# Patient Record
Sex: FEMALE | Race: WHITE | NOT HISPANIC OR LATINO | Employment: OTHER | URBAN - NONMETROPOLITAN AREA
[De-identification: names, ages, dates, MRNs, and addresses within clinical notes are randomized per-mention and may not be internally consistent; named-entity substitution may affect disease eponyms.]

---

## 2021-07-14 ENCOUNTER — OFFICE VISIT (OUTPATIENT)
Dept: FAMILY MEDICINE | Facility: OTHER | Age: 61
End: 2021-07-14
Attending: NURSE PRACTITIONER
Payer: COMMERCIAL

## 2021-07-14 VITALS
SYSTOLIC BLOOD PRESSURE: 110 MMHG | BODY MASS INDEX: 21.11 KG/M2 | DIASTOLIC BLOOD PRESSURE: 60 MMHG | TEMPERATURE: 98 F | HEART RATE: 59 BPM | WEIGHT: 139.3 LBS | OXYGEN SATURATION: 95 % | RESPIRATION RATE: 18 BRPM | HEIGHT: 68 IN

## 2021-07-14 DIAGNOSIS — N30.00 ACUTE CYSTITIS WITHOUT HEMATURIA: ICD-10-CM

## 2021-07-14 DIAGNOSIS — R39.89 URINARY PROBLEM: Primary | ICD-10-CM

## 2021-07-14 LAB
ALBUMIN UR-MCNC: NEGATIVE MG/DL
APPEARANCE UR: CLEAR
BACTERIA #/AREA URNS HPF: ABNORMAL /HPF
BILIRUB UR QL STRIP: NEGATIVE
COLOR UR AUTO: YELLOW
GLUCOSE UR STRIP-MCNC: NEGATIVE MG/DL
HGB UR QL STRIP: NEGATIVE
HYALINE CASTS: 2 /LPF
KETONES UR STRIP-MCNC: NEGATIVE MG/DL
LEUKOCYTE ESTERASE UR QL STRIP: ABNORMAL
NITRATE UR QL: NEGATIVE
PH UR STRIP: 7 [PH] (ref 5–9)
RBC URINE: 2 /HPF
SP GR UR STRIP: 1.01 (ref 1–1.03)
UROBILINOGEN UR STRIP-MCNC: NORMAL MG/DL
WBC CLUMPS #/AREA URNS HPF: PRESENT /HPF
WBC URINE: 172 /HPF

## 2021-07-14 PROCEDURE — 87086 URINE CULTURE/COLONY COUNT: CPT | Mod: ZL | Performed by: NURSE PRACTITIONER

## 2021-07-14 PROCEDURE — 99203 OFFICE O/P NEW LOW 30 MIN: CPT | Performed by: NURSE PRACTITIONER

## 2021-07-14 PROCEDURE — 81001 URINALYSIS AUTO W/SCOPE: CPT | Mod: ZL | Performed by: NURSE PRACTITIONER

## 2021-07-14 RX ORDER — SULFAMETHOXAZOLE AND TRIMETHOPRIM 400; 80 MG/1; MG/1
1 TABLET ORAL 2 TIMES DAILY
COMMUNITY
End: 2021-09-21

## 2021-07-14 RX ORDER — NITROFURANTOIN 25; 75 MG/1; MG/1
100 CAPSULE ORAL 2 TIMES DAILY
Qty: 10 CAPSULE | Refills: 0 | Status: SHIPPED | OUTPATIENT
Start: 2021-07-14 | End: 2021-07-19

## 2021-07-14 ASSESSMENT — PAIN SCALES - GENERAL: PAINLEVEL: MILD PAIN (2)

## 2021-07-14 ASSESSMENT — MIFFLIN-ST. JEOR: SCORE: 1255.36

## 2021-07-14 NOTE — NURSING NOTE
"Chief Complaint   Patient presents with     Urinary Problem     Patient presented to the clinic with a possible UTI that started a week ago. She had her fathers doctor prescribe the medication for her with out being seen at any facility. She is currently taking bacterium DS one tab twice daily that she started taking on July 2nd. She is still having pain and burning with urination.    Initial /60 (BP Location: Right arm, Patient Position: Sitting, Cuff Size: Adult Regular)   Pulse 59   Temp 98  F (36.7  C) (Tympanic)   Resp 18   Ht 1.727 m (5' 8\")   Wt 63.2 kg (139 lb 4.8 oz)   SpO2 95%   Breastfeeding No   BMI 21.18 kg/m   Estimated body mass index is 21.18 kg/m  as calculated from the following:    Height as of this encounter: 1.727 m (5' 8\").    Weight as of this encounter: 63.2 kg (139 lb 4.8 oz).     FOOD SECURITY SCREENING QUESTIONS  Hunger Vital Signs:  Within the past 12 months we worried whether our food would run out before we got money to buy more. Never  Within the past 12 months the food we bought just didn't last and we didn't have money to get more. Never      Medication Reconciliation: Complete      Jacque Hwang LPN   "

## 2021-07-14 NOTE — PROGRESS NOTES
ASSESSMENT/PLAN:    I have reviewed the nursing notes.  I have reviewed the findings, diagnosis, plan and need for follow up with the patient.    1. Urinary problem  2. Acute cystitis without hematuria  - nitroFURantoin macrocrystal-monohydrate (MACROBID) 100 MG capsule; Take 1 capsule (100 mg) by mouth 2 times daily for 5 days  Dispense: 10 capsule; Refill: 0  - UA reflex to Microscopic and Culture  - Urine Culture  Vital signs stable today. Today's WBC clumps and 172+ WBCs. Will call patient with culture results if indicate a change in antibiotics. Recommend full course of macrobid unless told otherwise following urine culture.  STOP taking bactrim.   Encouraged oral hydration/increased water intake.    May use over-the-counter Tylenol    Discussed warning signs/symptoms indicative of need to f/u    Follow up if symptoms persist or worsen or concerns    I explained my diagnostic considerations and recommendations to the patient, who voiced understanding and agreement with the treatment plan. All questions were answered. We discussed potential side effects of any prescribed or recommended therapies, as well as expectations for response to treatments.    Maeve Loja NP  7/14/2021  11:26 AM    HPI:  Yolette Don is a 60 year old female who presents to [facility] today for Patient presented to the clinic with a possible UTI that started a week ago. A doctor of her family member's prescribed bactrim DS 1 tab twice daily x14 days which has helped some but has not fully resolved her symptoms.  Was not seen at any facility and a urinalysis not obtained prior to start of antibiotics. Has 2 days left of bactrim. She is still having pain and burning with urination. No fevers or chills now. No concerns of any STIs or yeast. No vaginal discharge. No obvious hematuria. No back pain. Has urgency, less burning than she had initially. Drinking a lot of water, some frequency remains. Has been several years since last  "UTI, can't remember the last one.     Negative ROS aside from above HPI.           No past medical history on file.  No past surgical history on file.  Social History     Tobacco Use     Smoking status: Never Smoker     Smokeless tobacco: Never Used   Substance Use Topics     Alcohol use: Yes     Current Outpatient Medications   Medication Sig Dispense Refill     nitroFURantoin macrocrystal-monohydrate (MACROBID) 100 MG capsule Take 1 capsule (100 mg) by mouth 2 times daily for 5 days 10 capsule 0     sulfamethoxazole-trimethoprim (BACTRIM) 400-80 MG tablet Take 1 tablet by mouth 2 times daily       No Known Allergies  Past medical history, past surgical history, current medications and allergies reviewed and accurate to the best of my knowledge.      ROS:  Refer to HPI    /60 (BP Location: Right arm, Patient Position: Sitting, Cuff Size: Adult Regular)   Pulse 59   Temp 98  F (36.7  C) (Tympanic)   Resp 18   Ht 1.727 m (5' 8\")   Wt 63.2 kg (139 lb 4.8 oz)   SpO2 95%   Breastfeeding No   BMI 21.18 kg/m      EXAM:  General Appearance: Well appearing, 60 year old female, appropriate appearance for age. No acute distress  Respiratory: normal chest wall and respirations.  Normal effort.  Clear to auscultation bilaterally, no wheezing, crackles or rhonchi.  No increased work of breathing.  No cough appreciated.  Cardiac: RRR with no murmurs  Abdomen: soft, nontender, no rigidity, no rebound tenderness or guarding, normal bowel sounds present  : mild suprapubic tenderness to palpation.  No CVA tenderness to palpation.    Musculoskeletal:  Equal movement of bilateral upper extremities.  Equal movement of bilateral lower extremities.  Normal gait.    Dermatological: no rashes noted of exposed skin  Psychological: normal affect, alert, oriented, and pleasant.     "

## 2021-07-16 LAB — BACTERIA UR CULT: ABNORMAL

## 2021-09-21 ENCOUNTER — OFFICE VISIT (OUTPATIENT)
Dept: FAMILY MEDICINE | Facility: OTHER | Age: 61
End: 2021-09-21
Attending: FAMILY MEDICINE
Payer: COMMERCIAL

## 2021-09-21 VITALS
DIASTOLIC BLOOD PRESSURE: 86 MMHG | RESPIRATION RATE: 16 BRPM | WEIGHT: 138.8 LBS | HEIGHT: 68 IN | BODY MASS INDEX: 21.04 KG/M2 | HEART RATE: 64 BPM | OXYGEN SATURATION: 98 % | TEMPERATURE: 98.2 F | SYSTOLIC BLOOD PRESSURE: 126 MMHG

## 2021-09-21 DIAGNOSIS — Z02.89 HEALTH EXAMINATION OF DEFINED SUBPOPULATION: Primary | ICD-10-CM

## 2021-09-21 DIAGNOSIS — M47.812 SPONDYLOSIS OF CERVICAL REGION WITHOUT MYELOPATHY OR RADICULOPATHY: ICD-10-CM

## 2021-09-21 DIAGNOSIS — E04.1 THYROID NODULE: ICD-10-CM

## 2021-09-21 LAB
ANION GAP SERPL CALCULATED.3IONS-SCNC: 8 MMOL/L (ref 3–14)
BUN SERPL-MCNC: 15 MG/DL (ref 7–25)
CALCIUM SERPL-MCNC: 9.4 MG/DL (ref 8.6–10.3)
CHLORIDE BLD-SCNC: 102 MMOL/L (ref 98–107)
CO2 SERPL-SCNC: 29 MMOL/L (ref 21–31)
CREAT SERPL-MCNC: 0.79 MG/DL (ref 0.6–1.2)
GFR SERPL CREATININE-BSD FRML MDRD: 82 ML/MIN/1.73M2
GLUCOSE BLD-MCNC: 90 MG/DL (ref 70–105)
HOLD SPECIMEN: NORMAL
HOLD SPECIMEN: NORMAL
POTASSIUM BLD-SCNC: 3.9 MMOL/L (ref 3.5–5.1)
SODIUM SERPL-SCNC: 139 MMOL/L (ref 134–144)

## 2021-09-21 PROCEDURE — 36415 COLL VENOUS BLD VENIPUNCTURE: CPT | Mod: ZL | Performed by: FAMILY MEDICINE

## 2021-09-21 PROCEDURE — 80048 BASIC METABOLIC PNL TOTAL CA: CPT | Mod: ZL | Performed by: FAMILY MEDICINE

## 2021-09-21 PROCEDURE — 99396 PREV VISIT EST AGE 40-64: CPT | Performed by: FAMILY MEDICINE

## 2021-09-21 ASSESSMENT — PAIN SCALES - GENERAL: PAINLEVEL: NO PAIN (0)

## 2021-09-21 ASSESSMENT — MIFFLIN-ST. JEOR: SCORE: 1244.12

## 2021-09-21 NOTE — PROGRESS NOTES
"  SUBJECTIVE:   Yolette Don is a 61 year old female who presents to clinic today for the following health issues: Physical    Patient arrives here for physical.  She recently came back from [place].  She states that she has had a Pap smear mammogram within the last year.  Colonoscopy within the last 10 years.  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  Is moving back to [place] to establish a more permanent residency.  Currently does not offer any complaints or concerns        Patient Active Problem List    Diagnosis Date Noted     Spondylosis of cervical region without myelopathy or radiculopathy 09/21/2021     Priority: Medium     Thyroid nodule 09/21/2021     Priority: Medium     History reviewed. No pertinent past medical history.   Past Surgical History:   Procedure Laterality Date     COLONOSCOPY         Review of Systems     OBJECTIVE:     /86   Pulse 64   Temp 98.2  F (36.8  C) (Temporal)   Resp 16   Ht 1.721 m (5' 7.75\")   Wt 63 kg (138 lb 12.8 oz)   SpO2 98%   BMI 21.26 kg/m    Body mass index is 21.26 kg/m .  Physical Exam  Constitutional:       Appearance: Normal appearance.   Cardiovascular:      Rate and Rhythm: Normal rate and regular rhythm.   Pulmonary:      Effort: Pulmonary effort is normal.      Breath sounds: Normal breath sounds.   Musculoskeletal:      Cervical back: Normal range of motion.   Lymphadenopathy:      Cervical: No cervical adenopathy.   Neurological:      Mental Status: She is alert.   Psychiatric:         Mood and Affect: Mood normal.         Thought Content: Thought content normal.         Diagnostic Test Results:  Results for orders placed or performed in visit on 09/21/21 (from the past 24 hour(s))   Basic Metabolic Panel   Result Value Ref Range    Sodium 139 134 - 144 mmol/L    Potassium 3.9 3.5 - 5.1 mmol/L    Chloride 102 98 - 107 mmol/L    Carbon Dioxide (CO2) 29 21 - 31 mmol/L    " Anion Gap 8 3 - 14 mmol/L    Urea Nitrogen 15 7 - 25 mg/dL    Creatinine 0.79 0.60 - 1.20 mg/dL    Calcium 9.4 8.6 - 10.3 mg/dL    Glucose 90 70 - 105 mg/dL    GFR Estimate 82 >60 mL/min/1.73m2   Extra Tube    Narrative    The following orders were created for panel order Extra Tube.  Procedure                               Abnormality         Status                     ---------                               -----------         ------                     Extra Serum Separator Tu...[[id number]]                      Final result               Extra Purple Top Tube[[id number]]                            Final result                 Please view results for these tests on the individual orders.   Extra Serum Separator Tube (SST)   Result Value Ref Range    Hold Specimen JIC    Extra Purple Top Tube   Result Value Ref Range    Hold Specimen JIC        ASSESSMENT/PLAN:         (Z02.89) Health examination of defined subpopulation  (primary encounter diagnosis)  Comment: Record is updated.  She will bring her immunizations and we can update her [facility]  Plan: Basic Metabolic Panel            (M47.812) Spondylosis of cervical region without myelopathy or radiculopathy  Comment:   Plan:     (E04.1) Thyroid nodule  Comment:   Plan:       Erick Chowdhury MD  [facility]

## 2021-09-21 NOTE — NURSING NOTE
"Chief Complaint   Patient presents with     Physical     annual         Initial /86   Pulse 64   Temp 98.2  F (36.8  C) (Temporal)   Resp 16   Ht 1.721 m (5' 7.75\")   Wt 63 kg (138 lb 12.8 oz)   SpO2 98%   BMI 21.26 kg/m   Estimated body mass index is 21.26 kg/m  as calculated from the following:    Height as of this encounter: 1.721 m (5' 7.75\").    Weight as of this encounter: 63 kg (138 lb 12.8 oz).     FOOD SECURITY SCREENING QUESTIONS  Hunger Vital Signs:  Within the past 12 months we worried whether our food would run out before we got money to buy more. Never  Within the past 12 months the food we bought just didn't last and we didn't have money to get more. Never      Medication Reconciliation: Complete      Norma J. Gosselin, LPN   "

## 2021-10-06 ENCOUNTER — ALLIED HEALTH/NURSE VISIT (OUTPATIENT)
Dept: FAMILY MEDICINE | Facility: OTHER | Age: 61
End: 2021-10-06
Attending: FAMILY MEDICINE
Payer: COMMERCIAL

## 2021-10-06 DIAGNOSIS — Z20.822 EXPOSURE TO 2019 NOVEL CORONAVIRUS: Primary | ICD-10-CM

## 2021-10-06 PROCEDURE — C9803 HOPD COVID-19 SPEC COLLECT: HCPCS

## 2021-10-06 PROCEDURE — U0003 INFECTIOUS AGENT DETECTION BY NUCLEIC ACID (DNA OR RNA); SEVERE ACUTE RESPIRATORY SYNDROME CORONAVIRUS 2 (SARS-COV-2) (CORONAVIRUS DISEASE [COVID-19]), AMPLIFIED PROBE TECHNIQUE, MAKING USE OF HIGH THROUGHPUT TECHNOLOGIES AS DESCRIBED BY CMS-2020-01-R: HCPCS | Mod: ZL

## 2021-10-07 LAB — SARS-COV-2 RNA RESP QL NAA+PROBE: NEGATIVE

## 2021-10-17 ENCOUNTER — HEALTH MAINTENANCE LETTER (OUTPATIENT)
Age: 61
End: 2021-10-17

## 2021-12-08 ENCOUNTER — HOSPITAL ENCOUNTER (OUTPATIENT)
Dept: MAMMOGRAPHY | Facility: OTHER | Age: 61
Discharge: HOME | End: 2021-12-08
Attending: FAMILY MEDICINE | Admitting: FAMILY MEDICINE
Payer: COMMERCIAL

## 2021-12-08 DIAGNOSIS — Z12.31 VISIT FOR SCREENING MAMMOGRAM: ICD-10-CM

## 2021-12-08 PROCEDURE — 77067 SCR MAMMO BI INCL CAD: CPT

## 2022-10-03 ENCOUNTER — HEALTH MAINTENANCE LETTER (OUTPATIENT)
Age: 62
End: 2022-10-03

## 2023-02-11 ENCOUNTER — HEALTH MAINTENANCE LETTER (OUTPATIENT)
Age: 63
End: 2023-02-11

## 2023-08-05 ENCOUNTER — VIRTUAL VISIT (OUTPATIENT)
Dept: FAMILY MEDICINE | Facility: OTHER | Age: 63
End: 2023-08-05
Attending: NURSE PRACTITIONER
Payer: COMMERCIAL

## 2023-08-05 DIAGNOSIS — U07.1 INFECTION DUE TO 2019 NOVEL CORONAVIRUS: Primary | ICD-10-CM

## 2023-08-05 PROCEDURE — 99441 PR PHYSICIAN TELEPHONE EVALUATION 5-10 MIN: CPT | Mod: 95 | Performed by: NURSE PRACTITIONER

## 2023-08-05 NOTE — PROGRESS NOTES
"Yolette is a 63 year old who is being evaluated via a billable telephone visit.      What phone number would you like to be contacted at? [phone]  How would you like to obtain your AVS? Luz Maria    Distant Location (provider location):  Off-site    Assessment & Plan   Problem List Items Addressed This Visit    None  Visit Diagnoses       Infection due to 2019 novel coronavirus    -  Primary    Relevant Medications    nirmatrelvir and ritonavir (PAXLOVID) 300 mg/100 mg therapy pack           Home COVID test positive, she is interested in antiviral treatment.  She is not currently taking any medications.  Discussed side effects medication, risk and benefits.  Follow-up as needed.  Prescription drug management  6 minutes spent by me on the date of the encounter doing chart review, history and exam, documentation and further activities per the note           No follow-ups on file.    BARBARA Valdez [facility]    Subjective   Yolette is a 63 year old, presenting for the following health issues:  Covid Concern (Pos test 8/4/2023)      HPI       COVID-19 Symptom Review  How many days ago did these symptoms start? 3 days ago started with symptoms    Are any of the following symptoms significant for you?  New or worsening difficulty breathing? No  Worsening cough? Yes, I am coughing up mucus.  Fever or chills? Yes  Headache: YES  Sore throat: YES  Chest pain: No  Diarrhea: No  Body aches? YES    What treatments has patient tried? Cough syrup   Does patient live in a nursing home, group home, or shelter? No  Does patient have a way to get food/medications during quarantined? Yes, I have a friend or family member who can help me.     4 flights in past week, no known ill contacts      Review of Systems   See above      Objective    Vitals - Patient Reported  Weight (Patient Reported): 61.2 kg (135 lb)  Height (Patient Reported): 172.7 cm (5' 8\")  BMI (Based on Pt Reported Ht/Wt): " 20.53  Pain Score: No Pain (0)        Physical Exam   healthy, alert, and no distress  PSYCH: Alert and oriented times 3; coherent speech, normal   rate and volume, able to articulate logical thoughts, able   to abstract reason, no tangential thoughts, no hallucinations   or delusions  Her affect is normal  RESP: occasional cough, no audible wheezing, able to talk in full sentences  Remainder of exam unable to be completed due to telephone visits            Phone call duration: 5 minutes

## 2023-10-19 ENCOUNTER — HOSPITAL ENCOUNTER (OUTPATIENT)
Dept: MAMMOGRAPHY | Facility: OTHER | Age: 63
Discharge: HOME | End: 2023-10-19
Admitting: FAMILY MEDICINE
Payer: COMMERCIAL

## 2023-10-19 DIAGNOSIS — Z12.31 VISIT FOR SCREENING MAMMOGRAM: ICD-10-CM

## 2023-10-19 PROCEDURE — 77067 SCR MAMMO BI INCL CAD: CPT

## 2023-11-21 ENCOUNTER — OFFICE VISIT (OUTPATIENT)
Dept: FAMILY MEDICINE | Facility: OTHER | Age: 63
End: 2023-11-21
Attending: NURSE PRACTITIONER
Payer: COMMERCIAL

## 2023-11-21 VITALS
HEIGHT: 67 IN | TEMPERATURE: 97.8 F | DIASTOLIC BLOOD PRESSURE: 78 MMHG | SYSTOLIC BLOOD PRESSURE: 120 MMHG | BODY MASS INDEX: 21.97 KG/M2 | RESPIRATION RATE: 16 BRPM | HEART RATE: 63 BPM | WEIGHT: 140 LBS | OXYGEN SATURATION: 97 %

## 2023-11-21 DIAGNOSIS — Z12.4 CERVICAL CANCER SCREENING: ICD-10-CM

## 2023-11-21 DIAGNOSIS — Z00.00 ROUTINE GENERAL MEDICAL EXAMINATION AT A HEALTH CARE FACILITY: Primary | ICD-10-CM

## 2023-11-21 DIAGNOSIS — K59.01 SLOW TRANSIT CONSTIPATION: ICD-10-CM

## 2023-11-21 DIAGNOSIS — Z13.220 LIPID SCREENING: ICD-10-CM

## 2023-11-21 DIAGNOSIS — Z78.0 POSTMENOPAUSAL STATUS: ICD-10-CM

## 2023-11-21 DIAGNOSIS — R10.13 EPIGASTRIC PAIN: ICD-10-CM

## 2023-11-21 LAB
ALBUMIN SERPL BCG-MCNC: 4.1 G/DL (ref 3.5–5.2)
ALP SERPL-CCNC: 59 U/L (ref 40–150)
ALT SERPL W P-5'-P-CCNC: 21 U/L (ref 0–50)
ANION GAP SERPL CALCULATED.3IONS-SCNC: 10 MMOL/L (ref 7–15)
AST SERPL W P-5'-P-CCNC: 25 U/L (ref 0–45)
BILIRUB SERPL-MCNC: 0.3 MG/DL
BUN SERPL-MCNC: 17.9 MG/DL (ref 8–23)
CALCIUM SERPL-MCNC: 9 MG/DL (ref 8.8–10.2)
CHLORIDE SERPL-SCNC: 104 MMOL/L (ref 98–107)
CHOLEST SERPL-MCNC: 238 MG/DL
CREAT SERPL-MCNC: 0.79 MG/DL (ref 0.51–0.95)
DEPRECATED HCO3 PLAS-SCNC: 27 MMOL/L (ref 22–29)
EGFRCR SERPLBLD CKD-EPI 2021: 84 ML/MIN/1.73M2
GLUCOSE SERPL-MCNC: 87 MG/DL (ref 70–99)
HDLC SERPL-MCNC: 81 MG/DL
HOLD SPECIMEN: NORMAL
LDLC SERPL CALC-MCNC: 134 MG/DL
NONHDLC SERPL-MCNC: 157 MG/DL
POTASSIUM SERPL-SCNC: 3.7 MMOL/L (ref 3.4–5.3)
PROT SERPL-MCNC: 6.5 G/DL (ref 6.4–8.3)
SODIUM SERPL-SCNC: 141 MMOL/L (ref 135–145)
TRIGL SERPL-MCNC: 116 MG/DL

## 2023-11-21 PROCEDURE — 80053 COMPREHEN METABOLIC PANEL: CPT | Mod: ZL | Performed by: NURSE PRACTITIONER

## 2023-11-21 PROCEDURE — 80061 LIPID PANEL: CPT | Mod: ZL | Performed by: NURSE PRACTITIONER

## 2023-11-21 PROCEDURE — 99212 OFFICE O/P EST SF 10 MIN: CPT | Mod: 25 | Performed by: NURSE PRACTITIONER

## 2023-11-21 PROCEDURE — G0123 SCREEN CERV/VAG THIN LAYER: HCPCS | Performed by: NURSE PRACTITIONER

## 2023-11-21 PROCEDURE — 36415 COLL VENOUS BLD VENIPUNCTURE: CPT | Mod: ZL | Performed by: NURSE PRACTITIONER

## 2023-11-21 PROCEDURE — 99396 PREV VISIT EST AGE 40-64: CPT | Performed by: NURSE PRACTITIONER

## 2023-11-21 PROCEDURE — 87624 HPV HI-RISK TYP POOLED RSLT: CPT | Mod: ZL | Performed by: NURSE PRACTITIONER

## 2023-11-21 ASSESSMENT — ENCOUNTER SYMPTOMS
BREAST MASS: 0
ABDOMINAL PAIN: 1
CONSTIPATION: 1

## 2023-11-21 ASSESSMENT — PAIN SCALES - GENERAL: PAINLEVEL: NO PAIN (0)

## 2023-11-21 NOTE — COMMUNITY RESOURCES LIST (ENGLISH)
11/21/2023    [employer] [place]  N/A  For questions about this resource list or additional care needs, please contact your primary care clinic or care manager.  Phone: [phone]   Email: N/A   Address: [address] [place] [id number]   Hours: N/A        Financial Stability       Utility payment assistance  1  [employer] [facility] Main [facility] - Energy Assistance Program Distance: 10.46 miles      Phone/Virtual   [address] [facility] [address] [place] [address]  Language: English  Hours: Mon - Thu 8:00 AM - 4:30 PM , Fri 8:00 AM - 12:00 PM  Fees: Free   Phone: (441) 210-3334 Email: reyes@[employer] Website: http://www.[employer]          Important Numbers & Websites       Emergency Services   911  City Services   311  Poison Control   (834) 401-7758  Suicide Prevention Lifeline   (373) 181-3231 (TALK)  Child Abuse Hotline   (532) 818-1424 (4-A-Child)  Sexual Assault Hotline   (669) 196-4127 (HOPE)  National Runaway Safeline   (788) 783-4594 (RUNAWAY)  All-Options Talkline   (785) 871-7861  Substance Abuse Referral   (875) 810-3565 (HELP)

## 2023-11-21 NOTE — PROGRESS NOTES
SUBJECTIVE:   Yolette is a 63 year old, presenting for the following:  Physical        Healthy Habits:     Getting at least 3 servings of Calcium per day:  Yes    Bi-annual eye exam:  Yes    Dental care twice a year:  Yes    Sleep apnea or symptoms of sleep apnea:  None    Diet:  Regular (no restrictions)    Frequency of exercise:  4-5 days/week    Duration of exercise:  30-45 minutes    Taking medications regularly:  Yes    Medication side effects:  None    Additional concerns today:  Yes      Today's PHQ-2 Score:       11/21/2023     1:01 PM   PHQ-2 ( 1999 [employer])   Q1: Little interest or pleasure in doing things 1   Q2: Feeling down, depressed or hopeless 1   PHQ-2 Score 2   Q1: Little interest or pleasure in doing things Several days   Q2: Feeling down, depressed or hopeless Several days   PHQ-2 Score 2       She comes in today for annual physical.  She recently moved back to the area.  She has been traveling for the past several years, most recently was living in [facility].  She does report she had a colonoscopy completed in 2022 while she was in [facility].  They stated she was not fully cleaned out but did not have any concerns.  She has been having constipation, reports this is not a new concern but seems to be worsening.  She eats very healthy, increase fiber, no processed foods, fruits and vegetables.  She does regular exercise, Pilates and yoga.  She does report her stools are very dry and hard to pass.  Sometimes she has to press on her vaginal wall in order to pass her stools.  She has stools approximately 1 time a week.  She will use occasional over-the-counter senna.  She has had some epigastric tenderness, area is firm and tender with palpation.  Over-the-counter supplements include magnesium, vitamin C, DAVID and cognitive or oil.    She reports her last Pap smear was in 2022, possible HPV.  She is interested and getting DEXA scan and lab work updated.  Mammogram was updated recently.  She has had blood  donations in the past, hepatitis C was screened at that time.  She has had 2 shingles vaccines.    Social History     Tobacco Use    Smoking status: Never    Smokeless tobacco: Never   Substance Use Topics    Alcohol use: Yes     Alcohol/week: 5.0 standard drinks of alcohol     Types: 5 Glasses of wine per week             11/21/2023     1:01 PM   Alcohol Use   Prescreen: >3 drinks/day or >7 drinks/week? No     Reviewed orders with patient.  Reviewed health maintenance and updated orders accordingly - Yes  BP Readings from Last 3 Encounters:   11/21/23 120/78   09/21/21 126/86   07/14/21 110/60    Wt Readings from Last 3 Encounters:   11/21/23 63.5 kg (140 lb)   09/21/21 63 kg (138 lb 12.8 oz)   07/14/21 63.2 kg (139 lb 4.8 oz)                  Patient Active Problem List   Diagnosis    Spondylosis of cervical region without myelopathy or radiculopathy    Thyroid nodule     Past Surgical History:   Procedure Laterality Date    COLONOSCOPY         Social History     Tobacco Use    Smoking status: Never    Smokeless tobacco: Never   Substance Use Topics    Alcohol use: Yes     Alcohol/week: 5.0 standard drinks of alcohol     Types: 5 Glasses of wine per week     History reviewed. No pertinent family history.      No current outpatient medications on file.     No Known Allergies    Breast Cancer Screening:  Any new diagnosis of family breast, ovarian, or bowel cancer? No    FHS-7:       12/8/2021     2:36 PM   Breast CA Risk Assessment (FHS-7)   Did any of your first-degree relatives have breast or ovarian cancer? No   Did any of your relatives have bilateral breast cancer? No   Did any man in your family have breast cancer? No   Did any woman in your family have breast and ovarian cancer? No   Did any woman in your family have breast cancer before age 50 y? No   Do you have 2 or more relatives with breast and/or ovarian cancer? No   Do you have 2 or more relatives with breast and/or bowel cancer? No       Mammogram  "Screening: Recommended mammography every 1-2 years with patient discussion and risk factor consideration  Pertinent mammograms are reviewed under the imaging tab.    History of abnormal Pap smear: YES - other categories - see link Cervical Cytology Screening Guidelines     Reviewed and updated as needed this visit by clinical staff   Tobacco  Allergies  Meds      Soc Hx        Reviewed and updated as needed this visit by Provider                 History reviewed. No pertinent past medical history.   Past Surgical History:   Procedure Laterality Date    COLONOSCOPY         Review of Systems   Gastrointestinal:  Positive for abdominal pain and constipation.   Breasts:  Negative for tenderness, breast mass and discharge.   Genitourinary:  Negative for pelvic pain, vaginal bleeding and vaginal discharge.          OBJECTIVE:   /78   Pulse 63   Temp 97.8  F (36.6  C)   Resp 16   Ht 1.708 m (5' 7.25\")   Wt 63.5 kg (140 lb)   SpO2 97%   BMI 21.76 kg/m    Physical Exam  GENERAL: healthy, alert and no distress  EYES: Eyes grossly normal to inspection, PERRL and conjunctivae and sclerae normal  HENT: ear canals and TM's normal, nose and mouth without ulcers or lesions  NECK: no adenopathy, no asymmetry, masses, or scars and thyroid normal to palpation  RESP: lungs clear to auscultation - no rales, rhonchi or wheezes  BREAST: deferred, recent mammogram  CV: regular rate and rhythm, normal S1 S2, no S3 or S4, no murmur, click or rub, no peripheral edema and peripheral pulses strong  ABDOMEN: soft, epigastric tenderness, firmness appreciated to epigastric region, no hepatosplenomegaly, no masses and bowel sounds normal   (female): normal female external genitalia, normal urethral meatus, vaginal mucosa pink, moist, well rugated, and normal cervix/adnexa/uterus without masses or discharge. Pap with HPV obtained  MS: no gross musculoskeletal defects noted, no edema  SKIN: no suspicious lesions or rashes  NEURO: " Normal strength and tone, mentation intact and speech normal  PSYCH: mentation appears normal, affect normal/bright    Diagnostic Test Results:  Labs reviewed in Epic  Results for orders placed or performed in visit on 11/21/23   Lipid Panel     Status: Abnormal   Result Value Ref Range    Cholesterol 238 (H) <200 mg/dL    Triglycerides 116 <150 mg/dL    Direct Measure HDL 81 >=50 mg/dL    LDL Cholesterol Calculated 134 (H) <=100 mg/dL    Non HDL Cholesterol 157 (H) <130 mg/dL    Narrative    Cholesterol  Desirable:  <200 mg/dL    Triglycerides  Normal:  Less than 150 mg/dL  Borderline High:  150-199 mg/dL  High:  200-499 mg/dL  Very High:  Greater than or equal to 500 mg/dL    Direct Measure HDL  Female:  Greater than or equal to 50 mg/dL   Male:  Greater than or equal to 40 mg/dL    LDL Cholesterol  Desirable:  <100mg/dL  Above Desirable:  100-129 mg/dL   Borderline High:  130-159 mg/dL   High:  160-189 mg/dL   Very High:  >= 190 mg/dL    Non HDL Cholesterol  Desirable:  130 mg/dL  Above Desirable:  130-159 mg/dL  Borderline High:  160-189 mg/dL  High:  190-219 mg/dL  Very High:  Greater than or equal to 220 mg/dL   Comprehensive Metabolic Panel     Status: Normal   Result Value Ref Range    Sodium 141 135 - 145 mmol/L    Potassium 3.7 3.4 - 5.3 mmol/L    Carbon Dioxide (CO2) 27 22 - 29 mmol/L    Anion Gap 10 7 - 15 mmol/L    Urea Nitrogen 17.9 8.0 - 23.0 mg/dL    Creatinine 0.79 0.51 - 0.95 mg/dL    GFR Estimate 84 >60 mL/min/1.73m2    Calcium 9.0 8.8 - 10.2 mg/dL    Chloride 104 98 - 107 mmol/L    Glucose 87 70 - 99 mg/dL    Alkaline Phosphatase 59 40 - 150 U/L    AST 25 0 - 45 U/L    ALT 21 0 - 50 U/L    Protein Total 6.5 6.4 - 8.3 g/dL    Albumin 4.1 3.5 - 5.2 g/dL    Bilirubin Total 0.3 <=1.2 mg/dL   Extra Tube     Status: None    Narrative    The following orders were created for panel order Extra Tube.  Procedure                               Abnormality         Status                     ---------                                -----------         ------                     Extra Purple Top Tube[[id number]]                            Final result                 Please view results for these tests on the individual orders.   Extra Purple Top Tube     Status: None   Result Value Ref Range    Hold Specimen JIC        ASSESSMENT/PLAN:       ICD-10-CM    1. Routine general medical examination at a health care facility  Z00.00 Comprehensive Metabolic Panel     Comprehensive Metabolic Panel      2. Lipid screening  Z13.220 Lipid Panel     Lipid Panel      3. Cervical cancer screening  Z12.4 Pap Screen with HPV - recommended age 30 - 65 years      4. Postmenopausal status  Z78.0 DX Hip/Pelvis/Spine      5. Epigastric pain  R10.13 US Abdomen Complete      6. Slow transit constipation  K59.01       CMP and lipid panel updated  Pap smear with HPV obtained and pending, will follow-up results when available  DEXA scan ordered  Epigastric pain with firm mass appreciated, ultrasound ordered for initial evaluation of AAA.  Pending ultrasound results, consider CT scan  Likely slow transit constipation, we discussed treatment and recommendations including MiraLAX.  She will consider this as a treatment option.  Current on colonoscopy and immunizations, records not available due to being [place] outside of [place]  Will follow-up with above results when available    Patient has been advised of split billing requirements and indicates understanding: Yes    The 10-year ASCVD risk score (Gualberto DUPREE, et al., 2019) is: 3.2%    Values used to calculate the score:      Age: 63 years      Sex: Female      Is Non- : No      Diabetic: No      Tobacco smoker: No      Systolic Blood Pressure: 120 mmHg      Is BP treated: No      HDL Cholesterol: 81 mg/dL      Total Cholesterol: 238 mg/dL    COUNSELING:  Reviewed preventive health counseling, as reflected in patient instructions       Regular exercise       Healthy  diet/nutrition       Vision screening       Osteoporosis prevention/bone health       Colorectal Cancer Screening        She reports that she has never smoked. She has never used smokeless tobacco.          BARBARA Valdez [facility]

## 2023-11-21 NOTE — COMMUNITY RESOURCES LIST (ENGLISH)
11/21/2023   [facility] - Outpatient Clinics  N/A  For additional resource needs, please contact your health insurance member services or your primary care team.  Phone: [phone]   Email: N/A   Address: [facility] [place] [address]   Hours: N/A        Financial Stability       Utility payment assistance  1  [place] [employer] - [place]'s Telephone Assistance Plan (TAP) and Federal Lifeline and Affordable Connectivity Program (ACP) Distance: 167.78 miles      Phone/Virtual   [address] [place] [address]  Language: English  Fees: Free   Phone: (169) 972-2502 Email: consumer.puc@[facility].mn. Website: https://[place].gov/puc/consumers/telephone/     2  [place] [employer] - [employer] and Utilities Distance: 168.29 miles      In-Person, Phone/Virtual   [address] [place] [address]  Language: English  Hours: Mon - Fri 8:30 AM - 4:30 PM  Fees: Free   Phone: (763) 549-7920 Website: [place]://[place]/[employer]/energy/consumer-assistance/energy-assistance-program/          Important Numbers & Websites       [facility]   211 211itedway.org  Poison Control   (356) 871-3386 [place]poison[place]  Suicide and Crisis Lifeline   988 [facility]  Childhelp [place] Child Abuse Hotline   748.765.7630 Childhelphotline.org  National Sexual Assault Hotline   (949) 972-1088 (HOPE) Rainn.org  National Runaway Safeline   (871) 398-9312 (RUNAWAY) 1800runaway.org  Pregnancy & Postpartum Support [place]   Call/text 593-002-6785 Ppsupport[place].org  Substance Abuse National Helpline ([facility]   822-342-HELP (6007) Findtreatment.gov  Emergency Services   911

## 2023-11-29 LAB
BKR LAB AP GYN ADEQUACY: NORMAL
BKR LAB AP GYN INTERPRETATION: NORMAL
BKR LAB AP HPV REFLEX: NORMAL
BKR LAB AP PREVIOUS ABNORMAL: NORMAL
PATH REPORT.COMMENTS IMP SPEC: NORMAL
PATH REPORT.COMMENTS IMP SPEC: NORMAL
PATH REPORT.RELEVANT HX SPEC: NORMAL

## 2023-12-01 LAB
HUMAN PAPILLOMA VIRUS 16 DNA: NEGATIVE
HUMAN PAPILLOMA VIRUS 18 DNA: NEGATIVE
HUMAN PAPILLOMA VIRUS FINAL DIAGNOSIS: NORMAL
HUMAN PAPILLOMA VIRUS OTHER HR: NEGATIVE

## 2023-12-07 ENCOUNTER — HOSPITAL ENCOUNTER (OUTPATIENT)
Dept: BONE DENSITY | Facility: OTHER | Age: 63
Discharge: HOME | End: 2023-12-07
Attending: NURSE PRACTITIONER | Admitting: NURSE PRACTITIONER
Payer: COMMERCIAL

## 2023-12-07 DIAGNOSIS — Z78.0 POSTMENOPAUSAL STATUS: ICD-10-CM

## 2023-12-07 PROCEDURE — 77080 DXA BONE DENSITY AXIAL: CPT

## 2023-12-20 ENCOUNTER — HOSPITAL ENCOUNTER (OUTPATIENT)
Dept: ULTRASOUND IMAGING | Facility: OTHER | Age: 63
Discharge: HOME | End: 2023-12-20
Attending: NURSE PRACTITIONER | Admitting: NURSE PRACTITIONER
Payer: COMMERCIAL

## 2023-12-20 DIAGNOSIS — R10.13 EPIGASTRIC PAIN: ICD-10-CM

## 2023-12-20 PROCEDURE — 76700 US EXAM ABDOM COMPLETE: CPT

## 2024-10-22 ENCOUNTER — TELEPHONE (OUTPATIENT)
Dept: FAMILY MEDICINE | Facility: OTHER | Age: 64
End: 2024-10-22
Payer: COMMERCIAL

## 2024-10-22 ENCOUNTER — OFFICE VISIT (OUTPATIENT)
Dept: FAMILY MEDICINE | Facility: OTHER | Age: 64
End: 2024-10-22
Attending: NURSE PRACTITIONER
Payer: COMMERCIAL

## 2024-10-22 VITALS
DIASTOLIC BLOOD PRESSURE: 72 MMHG | BODY MASS INDEX: 22.35 KG/M2 | HEART RATE: 57 BPM | OXYGEN SATURATION: 96 % | TEMPERATURE: 98.5 F | RESPIRATION RATE: 16 BRPM | SYSTOLIC BLOOD PRESSURE: 106 MMHG | HEIGHT: 67 IN | WEIGHT: 142.4 LBS

## 2024-10-22 DIAGNOSIS — Z13.220 LIPID SCREENING: Primary | ICD-10-CM

## 2024-10-22 DIAGNOSIS — L98.9 SKIN LESION: ICD-10-CM

## 2024-10-22 DIAGNOSIS — Z13.1 SCREENING FOR DIABETES MELLITUS: ICD-10-CM

## 2024-10-22 DIAGNOSIS — Z00.00 ROUTINE GENERAL MEDICAL EXAMINATION AT A HEALTH CARE FACILITY: Primary | ICD-10-CM

## 2024-10-22 DIAGNOSIS — H91.93 BILATERAL HEARING LOSS, UNSPECIFIED HEARING LOSS TYPE: ICD-10-CM

## 2024-10-22 PROCEDURE — 99396 PREV VISIT EST AGE 40-64: CPT | Performed by: NURSE PRACTITIONER

## 2024-10-22 SDOH — HEALTH STABILITY: PHYSICAL HEALTH: ON AVERAGE, HOW MANY DAYS PER WEEK DO YOU ENGAGE IN MODERATE TO STRENUOUS EXERCISE (LIKE A BRISK WALK)?: 3 DAYS

## 2024-10-22 SDOH — HEALTH STABILITY: PHYSICAL HEALTH: ON AVERAGE, HOW MANY MINUTES DO YOU ENGAGE IN EXERCISE AT THIS LEVEL?: 40 MIN

## 2024-10-22 ASSESSMENT — SOCIAL DETERMINANTS OF HEALTH (SDOH): HOW OFTEN DO YOU GET TOGETHER WITH FRIENDS OR RELATIVES?: ONCE A WEEK

## 2024-10-22 ASSESSMENT — PAIN SCALES - GENERAL: PAINLEVEL: NO PAIN (0)

## 2024-10-22 NOTE — TELEPHONE ENCOUNTER
Labs ordered. If she is unable to be done today, can schedule as a lab only on another day after her physical. BARBARA Valdez CNP on 10/22/2024 at 8:55 AM     Zeina stated that Dr Skinner took the patient off of his medication but she feels like he should be on it. She did not know the name of it. Please call to discuss.

## 2024-10-22 NOTE — NURSING NOTE
Patient is here for annual physical, has concerns with ears.     No LMP recorded (lmp unknown). Patient is postmenopausal.  Medication Reconciliation: complete    Carlita Mace LPN 10/22/2024 3:10 PM       Advance care directive on file? no  Advance care directive provided to patient? yes       Carlita Mace LPN     Statement Selected

## 2024-10-22 NOTE — PROGRESS NOTES
Preventive Care Visit  [facility] AND [facility]  BARBARA Valdez CNP, Nurse Practitioner - Family  Oct 22, 2024      Assessment & Plan   Problem List Items Addressed This Visit    None  Visit Diagnoses       Routine general medical examination at a health care facility    -  Primary    Skin lesion        Relevant Orders    Adult Dermatology  Referral    Bilateral hearing loss, unspecified hearing loss type            Routine physical exam.  She is up-to-date on colonoscopy, last completed in 2022.  She reports [place].  Up-to-date on mammogram.  Referral to dermatology for skin lesion, concerning for basal cell carcinoma  Hearing loss, ear exam today was normal, recommend continuing work with audiology  Follow-up annually and as needed    Patient has been advised of split billing requirements and indicates understanding: Yes       Counseling  Appropriate preventive services were addressed with this patient via screening, questionnaire, or discussion as appropriate for fall prevention, nutrition, physical activity, Tobacco-use cessation, social engagement, weight loss and cognition.  Checklist reviewing preventive services available has been given to the patient.  Reviewed patient's diet, addressing concerns and/or questions.   She is at risk for lack of exercise and has been provided with information to increase physical activity for the benefit of her well-being.   The patient was instructed to see the dentist every 6 months.         Return in about 53 weeks (around 10/28/2025) for Annual Wellness Visit.      Saima De La Vega is a 64 year old, presenting for the following:  Physical        10/22/2024     3:04 PM   Additional Questions   Roomed by Carlita amor        Health Care Directive  Patient does not have a Health Care Directive or Living Will: Discussed advance care planning with patient; information given to patient to  review.    HPI    Patient presents to clinic today for annual wellness exam.  She had audiology test 2 weeks ago, noted some hearing loss.  Left ear feels stuffy and pressure.  She started eardrops for earwax last night.  Has noted some increased tinnitus.  There is discussion about possible MRI for further evaluation.  She does report history of melanoma, skin lesion on her right scapula that she would like to be evaluated.          10/22/2024   General Health   How would you rate your overall physical health? Good   Feel stress (tense, anxious, or unable to sleep) To some extent      (!) STRESS CONCERN      10/22/2024   Nutrition   Three or more servings of calcium each day? Yes   Diet: Regular (no restrictions)   How many servings of fruit and vegetables per day? (!) 2-3   How many sweetened beverages each day? 0-1            10/22/2024   Exercise   Days per week of moderate/strenous exercise 3 days   Average minutes spent exercising at this level 40 min            10/22/2024   Social Factors   Frequency of gathering with friends or relatives Once a week   Worry food won't last until get money to buy more No   Food not last or not have enough money for food? No   Do you have housing? (Housing is defined as stable permanent housing and does not include staying ouside in a car, in a tent, in an abandoned building, in an overnight shelter, or couch-surfing.) Yes   Are you worried about losing your housing? No   Lack of transportation? No   Unable to get utilities (heat,electricity)? No            10/22/2024   Fall Risk   Fallen 2 or more times in the past year? No   Trouble with walking or balance? No             10/22/2024   Dental   Dentist two times every year? (!) NO            10/22/2024   TB Screening   Were you born outside of the [place]? No            Today's PHQ-2 Score:       10/22/2024    11:25 AM   PHQ-2 ( 1999 [employer])   Q1: Little interest or pleasure in doing things 1   Q2: Feeling down, depressed or  hopeless 1   PHQ-2 Score 2   Q1: Little interest or pleasure in doing things Several days   Q2: Feeling down, depressed or hopeless Several days   PHQ-2 Score 2           10/22/2024   Substance Use   Alcohol more than 3/day or more than 7/wk No   Do you use any other substances recreationally? No        Social History     Tobacco Use    Smoking status: Never     Passive exposure: Never    Smokeless tobacco: Never   Vaping Use    Vaping status: Never Used   Substance Use Topics    Alcohol use: Yes     Alcohol/week: 5.0 standard drinks of alcohol     Types: 5 Glasses of wine per week    Drug use: Never           12/8/2021   LAST FHS-7 RESULTS   1st degree relative breast or ovarian cancer No   Any relative bilateral breast cancer No   Any male have breast cancer No   Any ONE woman have BOTH breast AND ovarian cancer No   Any woman with breast cancer before 50yrs No   2 or more relatives with breast AND/OR ovarian cancer No   2 or more relatives with breast AND/OR bowel cancer No           Mammogram Screening - Mammogram every 1-2 years updated in Health Maintenance based on mutual decision making        10/22/2024   STI Screening   New sexual partner(s) since last STI/HIV test? No        History of abnormal Pap smear: No - age 30- 64 PAP with HPV every 5 years recommended        Latest Ref Rng & Units 11/21/2023     1:36 PM   PAP / HPV   PAP  Negative for Intraepithelial Lesion or Malignancy (NILM)    HPV 16 DNA Negative Negative    HPV 18 DNA Negative Negative    Other HR HPV Negative Negative      ASCVD Risk   The 10-year ASCVD risk score (Gualberto DUPREE, et al., 2019) is: 2.9%    Values used to calculate the score:      Age: 64 years      Sex: Female      Is Non- : No      Diabetic: No      Tobacco smoker: No      Systolic Blood Pressure: 106 mmHg      Is BP treated: No      HDL Cholesterol: 81 mg/dL      Total Cholesterol: 238 mg/dL           Reviewed and updated as needed this visit by  "Provider                    History reviewed. No pertinent past medical history.  Past Surgical History:   Procedure Laterality Date    COLONOSCOPY       Patient Active Problem List   Diagnosis    Spondylosis of cervical region without myelopathy or radiculopathy    Thyroid nodule     Past Surgical History:   Procedure Laterality Date    COLONOSCOPY         Social History     Tobacco Use    Smoking status: Never     Passive exposure: Never    Smokeless tobacco: Never   Substance Use Topics    Alcohol use: Yes     Alcohol/week: 5.0 standard drinks of alcohol     Types: 5 Glasses of wine per week     Family History   Problem Relation Age of Onset    Impaired Fasting Glucose Mother     Bladder Cancer Father     Cardiovascular Maternal Grandfather          Current Outpatient Medications   Medication Sig Dispense Refill    MAGNESIUM PO Take by mouth.       No Known Allergies         Objective    Exam  /72   Pulse 57   Temp 98.5  F (36.9  C) (Tympanic)   Resp 16   Ht 1.702 m (5' 7\")   Wt 64.6 kg (142 lb 6.4 oz)   LMP  (LMP Unknown)   SpO2 96%   Breastfeeding No   BMI 22.30 kg/m     Estimated body mass index is 22.3 kg/m  as calculated from the following:    Height as of this encounter: 1.702 m (5' 7\").    Weight as of this encounter: 64.6 kg (142 lb 6.4 oz).    Physical Exam  GENERAL: alert and no distress  EYES: Eyes grossly normal to inspection, PERRL and conjunctivae and sclerae normal  HENT: ear canals and TM's normal, nose and mouth without ulcers or lesions  NECK: no adenopathy, no asymmetry, masses, or scars  RESP: lungs clear to auscultation - no rales, rhonchi or wheezes  CV: regular rate and rhythm, normal S1 S2, no S3 or S4, no murmur, click or rub, no peripheral edema  ABDOMEN: soft, nontender, no hepatosplenomegaly, no masses and bowel sounds normal  MS: no gross musculoskeletal defects noted, no edema  SKIN: right scapula with pearly pink raised lesion  NEURO: Normal strength and tone, " mentation intact and speech normal  PSYCH: mentation appears normal, affect normal/bright        Signed Electronically by: BARBARA Valdez CNP

## 2024-10-22 NOTE — PATIENT INSTRUCTIONS
[facility]      Patient Education   Preventive Care Advice   This is general advice given by our system to help you stay healthy. However, your care team may have specific advice just for you. Please talk to your care team about your preventive care needs.  Nutrition  Eat 5 or more servings of fruits and vegetables each day.  Try wheat bread, brown rice and whole grain pasta (instead of white bread, rice, and pasta).  Get enough calcium and vitamin D. Check the label on foods and aim for 100% of the RDA (recommended daily allowance).  Lifestyle  Exercise at least 150 minutes each week  (30 minutes a day, 5 days a week).  Do muscle strengthening activities 2 days a week. These help control your weight and prevent disease.  No smoking.  Wear sunscreen to prevent skin cancer.  Have a dental exam and cleaning every 6 months.  Yearly exams  See your health care team every year to talk about:  Any changes in your health.  Any medicines your care team has prescribed.  Preventive care, family planning, and ways to prevent chronic diseases.  Shots (vaccines)   HPV shots (up to age 26), if you've never had them before.  Hepatitis B shots (up to age 59), if you've never had them before.  COVID-19 shot: Get this shot when it's due.  Flu shot: Get a flu shot every year.  Tetanus shot: Get a tetanus shot every 10 years.  Pneumococcal, hepatitis A, and RSV shots: Ask your care team if you need these based on your risk.  Shingles shot (for age 50 and up)  General health tests  Diabetes screening:  Starting at age 35, Get screened for diabetes at least every 3 years.  If you are younger than age 35, ask your care team if you should be screened for diabetes.  Cholesterol test: At age 39, start having a cholesterol test every 5 years, or more often if advised.  Bone density scan (DEXA): At age 50, ask your care team if you should have this scan for osteoporosis (brittle bones).  Hepatitis C: Get tested  at least once in your life.  STIs (sexually transmitted infections)  Before age 24: Ask your care team if you should be screened for STIs.  After age 24: Get screened for STIs if you're at risk. You are at risk for STIs (including HIV) if:  You are sexually active with more than one person.  You don't use condoms every time.  You or a partner was diagnosed with a sexually transmitted infection.  If you are at risk for HIV, ask about PrEP medicine to prevent HIV.  Get tested for HIV at least once in your life, whether you are at risk for HIV or not.  Cancer screening tests  Cervical cancer screening: If you have a cervix, begin getting regular cervical cancer screening tests starting at age 21.  Breast cancer scan (mammogram): If you've ever had breasts, begin having regular mammograms starting at age 40. This is a scan to check for breast cancer.  Colon cancer screening: It is important to start screening for colon cancer at age 45.  Have a colonoscopy test every 10 years (or more often if you're at risk) Or, ask your provider about stool tests like a FIT test every year or Cologuard test every 3 years.  To learn more about your testing options, visit:   .  For help making a decision, visit:   https://bit.ly/sa80333.  Prostate cancer screening test: If you have a prostate, ask your care team if a prostate cancer screening test (PSA) at age 55 is right for you.  Lung cancer screening: If you are a current or former smoker ages 50 to 80, ask your care team if ongoing lung cancer screenings are right for you.  For informational purposes only. Not to replace the advice of your health care provider. Copyright   2023 [employer]. All rights reserved. Clinically reviewed by the [facility] Transitions Program. Distra [id number] - REV 01/24.

## 2024-10-22 NOTE — TELEPHONE ENCOUNTER
Spoke to patient, she is going to wait and schedule after appt.  Carlita Mace LPN .............10/22/2024     8:59 AM

## 2024-10-22 NOTE — TELEPHONE ENCOUNTER
Last labs done 11/21/23 orders pending for cmp and lipid.   Carlita Mace LPN .............10/22/2024     8:42 AM

## 2024-10-22 NOTE — TELEPHONE ENCOUNTER
Patient would like labs this morning before her appointment with [facility] today at  3:05 pm. There  are no lab orders. Patient would have to be worked in at the lab. Patient said she can not fast all day for labs at appointment. Please call and let her know if they can be done earlier than appointment or should she reschedule?        Carissa Foster on 10/22/2024 at 8:29 AM

## 2024-10-30 ENCOUNTER — LAB (OUTPATIENT)
Dept: LAB | Facility: OTHER | Age: 64
End: 2024-10-30
Attending: NURSE PRACTITIONER
Payer: COMMERCIAL

## 2024-10-30 DIAGNOSIS — Z13.220 LIPID SCREENING: ICD-10-CM

## 2024-10-30 DIAGNOSIS — Z13.1 SCREENING FOR DIABETES MELLITUS: ICD-10-CM

## 2024-10-30 LAB
ALBUMIN SERPL BCG-MCNC: 4.1 G/DL (ref 3.5–5.2)
ALP SERPL-CCNC: 54 U/L (ref 40–150)
ALT SERPL W P-5'-P-CCNC: 16 U/L (ref 0–50)
ANION GAP SERPL CALCULATED.3IONS-SCNC: 8 MMOL/L (ref 7–15)
AST SERPL W P-5'-P-CCNC: 19 U/L (ref 0–45)
BILIRUB SERPL-MCNC: 0.5 MG/DL
BUN SERPL-MCNC: 19 MG/DL (ref 8–23)
CALCIUM SERPL-MCNC: 8.9 MG/DL (ref 8.8–10.4)
CHLORIDE SERPL-SCNC: 104 MMOL/L (ref 98–107)
CHOLEST SERPL-MCNC: 224 MG/DL
CREAT SERPL-MCNC: 0.88 MG/DL (ref 0.51–0.95)
EGFRCR SERPLBLD CKD-EPI 2021: 73 ML/MIN/1.73M2
FASTING STATUS PATIENT QL REPORTED: ABNORMAL
FASTING STATUS PATIENT QL REPORTED: NORMAL
GLUCOSE SERPL-MCNC: 95 MG/DL (ref 70–99)
HCO3 SERPL-SCNC: 28 MMOL/L (ref 22–29)
HDLC SERPL-MCNC: 83 MG/DL
LDLC SERPL CALC-MCNC: 130 MG/DL
NONHDLC SERPL-MCNC: 141 MG/DL
POTASSIUM SERPL-SCNC: 4.5 MMOL/L (ref 3.4–5.3)
PROT SERPL-MCNC: 6.4 G/DL (ref 6.4–8.3)
SODIUM SERPL-SCNC: 140 MMOL/L (ref 135–145)
TRIGL SERPL-MCNC: 53 MG/DL

## 2024-10-30 PROCEDURE — 82247 BILIRUBIN TOTAL: CPT | Mod: ZL

## 2024-10-30 PROCEDURE — 80061 LIPID PANEL: CPT | Mod: ZL

## 2024-10-30 PROCEDURE — 36415 COLL VENOUS BLD VENIPUNCTURE: CPT | Mod: ZL

## 2024-11-06 ENCOUNTER — HOSPITAL ENCOUNTER (OUTPATIENT)
Dept: MAMMOGRAPHY | Facility: OTHER | Age: 64
Discharge: HOME | End: 2024-11-06
Attending: NURSE PRACTITIONER | Admitting: NURSE PRACTITIONER
Payer: COMMERCIAL

## 2024-11-06 DIAGNOSIS — Z12.31 VISIT FOR SCREENING MAMMOGRAM: ICD-10-CM

## 2024-11-06 PROCEDURE — 77063 BREAST TOMOSYNTHESIS BI: CPT

## 2025-01-08 ENCOUNTER — TELEPHONE (OUTPATIENT)
Dept: FAMILY MEDICINE | Facility: OTHER | Age: 65
End: 2025-01-08
Payer: COMMERCIAL

## 2025-01-08 DIAGNOSIS — L98.9 SKIN LESION: Primary | ICD-10-CM

## 2025-01-08 NOTE — TELEPHONE ENCOUNTER
Returned call to patient. Patient is requesting dermatology referral be sent to [facility] in [place]. New order pended.    Kiara Calixto LPN on 1/8/2025 at 1:16 PM

## 2025-01-08 NOTE — TELEPHONE ENCOUNTER
Patient would like to discuss getting a referral to dermatology consult. Please fax to  [phone] to [facility] in [place] on 1/8/2025 at 9:44 AM